# Patient Record
Sex: FEMALE | Race: WHITE | NOT HISPANIC OR LATINO | URBAN - METROPOLITAN AREA
[De-identification: names, ages, dates, MRNs, and addresses within clinical notes are randomized per-mention and may not be internally consistent; named-entity substitution may affect disease eponyms.]

---

## 2023-09-19 ENCOUNTER — HOSPITAL ENCOUNTER (OUTPATIENT)
Dept: DATA CONVERSION | Facility: HOSPITAL | Age: 23
Discharge: HOME | End: 2023-09-19

## 2023-09-19 DIAGNOSIS — Z11.3 ENCOUNTER FOR SCREENING FOR INFECTIONS WITH A PREDOMINANTLY SEXUAL MODE OF TRANSMISSION: ICD-10-CM

## 2023-09-19 DIAGNOSIS — Z12.4 ENCOUNTER FOR SCREENING FOR MALIGNANT NEOPLASM OF CERVIX: ICD-10-CM

## 2023-09-19 DIAGNOSIS — Z01.419 ENCOUNTER FOR GYNECOLOGICAL EXAMINATION (GENERAL) (ROUTINE) WITHOUT ABNORMAL FINDINGS: ICD-10-CM

## 2023-09-19 LAB
HBV SURFACE AG SER QL: NEGATIVE
HCV AB SER QL: NORMAL
HIV 1+2 AB+HIV1 P24 AG SERPL QL IA: NORMAL

## 2023-09-20 LAB
C TRACH RRNA SPEC QL NAA+PROBE: NORMAL
DRUG SCREEN COMMENT UR-IMP: NORMAL
N GONORRHOEA RRNA SPEC QL NAA+PROBE: NORMAL
SPECIMEN SOURCE: NORMAL

## 2023-09-22 LAB — RPR SER QL: NONREACTIVE

## 2023-11-11 ENCOUNTER — LAB REQUISITION (OUTPATIENT)
Dept: LAB | Facility: HOSPITAL | Age: 23
End: 2023-11-11

## 2023-11-11 LAB — SARS-COV-2 RNA RESP QL NAA+PROBE: DETECTED

## 2023-11-11 PROCEDURE — 87635 SARS-COV-2 COVID-19 AMP PRB: CPT

## 2024-12-04 ENCOUNTER — APPOINTMENT (OUTPATIENT)
Dept: PRIMARY CARE | Facility: CLINIC | Age: 24
End: 2024-12-04
Payer: COMMERCIAL

## 2024-12-13 ENCOUNTER — APPOINTMENT (OUTPATIENT)
Dept: PRIMARY CARE | Facility: CLINIC | Age: 24
End: 2024-12-13
Payer: COMMERCIAL

## 2024-12-13 VITALS
SYSTOLIC BLOOD PRESSURE: 112 MMHG | OXYGEN SATURATION: 97 % | WEIGHT: 190 LBS | HEART RATE: 89 BPM | BODY MASS INDEX: 29.76 KG/M2 | DIASTOLIC BLOOD PRESSURE: 79 MMHG

## 2024-12-13 DIAGNOSIS — Z13.21 ENCOUNTER FOR VITAMIN DEFICIENCY SCREENING: ICD-10-CM

## 2024-12-13 DIAGNOSIS — Z00.00 WELLNESS EXAMINATION: Primary | ICD-10-CM

## 2024-12-13 DIAGNOSIS — R63.5 WEIGHT GAIN: ICD-10-CM

## 2024-12-13 DIAGNOSIS — Z13.220 SCREENING FOR HYPERLIPIDEMIA: ICD-10-CM

## 2024-12-13 LAB
25(OH)D3 SERPL-MCNC: 24 NG/ML (ref 30–100)
ALBUMIN SERPL BCP-MCNC: 4.5 G/DL (ref 3.4–5)
ALP SERPL-CCNC: 100 U/L (ref 33–110)
ALT SERPL W P-5'-P-CCNC: 13 U/L (ref 7–45)
ANION GAP SERPL CALC-SCNC: 9 MMOL/L (ref 10–20)
AST SERPL W P-5'-P-CCNC: 15 U/L (ref 9–39)
BILIRUB SERPL-MCNC: 0.6 MG/DL (ref 0–1.2)
BUN SERPL-MCNC: 14 MG/DL (ref 6–23)
CALCIUM SERPL-MCNC: 9.4 MG/DL (ref 8.6–10.3)
CHLORIDE SERPL-SCNC: 106 MMOL/L (ref 98–107)
CHOLEST SERPL-MCNC: 159 MG/DL (ref 0–199)
CHOLESTEROL/HDL RATIO: 4.6
CO2 SERPL-SCNC: 25 MMOL/L (ref 21–32)
CREAT SERPL-MCNC: 0.75 MG/DL (ref 0.5–1.05)
EGFRCR SERPLBLD CKD-EPI 2021: >90 ML/MIN/1.73M*2
GLUCOSE SERPL-MCNC: 83 MG/DL (ref 74–99)
HDLC SERPL-MCNC: 34.6 MG/DL
LDLC SERPL CALC-MCNC: 102 MG/DL
NON HDL CHOLESTEROL: 124 MG/DL (ref 0–149)
POTASSIUM SERPL-SCNC: 4.1 MMOL/L (ref 3.5–5.3)
PROT SERPL-MCNC: 7.5 G/DL (ref 6.4–8.2)
SODIUM SERPL-SCNC: 136 MMOL/L (ref 136–145)
T4 FREE SERPL-MCNC: 0.88 NG/DL (ref 0.61–1.12)
TRIGL SERPL-MCNC: 114 MG/DL (ref 0–149)
TSH SERPL-ACNC: 4.51 MIU/L (ref 0.44–3.98)
VLDL: 23 MG/DL (ref 0–40)

## 2024-12-13 PROCEDURE — 84443 ASSAY THYROID STIM HORMONE: CPT

## 2024-12-13 PROCEDURE — 99385 PREV VISIT NEW AGE 18-39: CPT

## 2024-12-13 PROCEDURE — 82306 VITAMIN D 25 HYDROXY: CPT

## 2024-12-13 PROCEDURE — 84439 ASSAY OF FREE THYROXINE: CPT

## 2024-12-13 PROCEDURE — 80061 LIPID PANEL: CPT

## 2024-12-13 PROCEDURE — 80053 COMPREHEN METABOLIC PANEL: CPT

## 2024-12-13 PROCEDURE — 1036F TOBACCO NON-USER: CPT

## 2024-12-13 ASSESSMENT — PATIENT HEALTH QUESTIONNAIRE - PHQ9
SUM OF ALL RESPONSES TO PHQ9 QUESTIONS 1 AND 2: 0
1. LITTLE INTEREST OR PLEASURE IN DOING THINGS: NOT AT ALL
2. FEELING DOWN, DEPRESSED OR HOPELESS: NOT AT ALL

## 2024-12-13 NOTE — PROGRESS NOTES
Subjective   Patient ID: Jacqueline Jennings is a 24 y.o. female who presents for Establish Care (Pt states she is here to establish care. Pt states she sees an OBGYN. No other concerns at the moment.).    Patient presents to establish care. She states that she has not had a physical in a few years but did have to stay up on immunizations for nursing school. She currently works as an ICU nurse for .   She does endorse some weight gain, concerned she may have PCOS but does have an upcoming appointment with Gynecology in January.     She states that she does have a strong family history of breast cancer. She had two paternal aunts that were diagnosed, one passed away in her early 40's and the other was diagnosed in her 50's but in remission. She does do self breast exams, but not consistently.     Past Medical, Surgical, Social and Family Hx reviewed-Yes      Last pap: 2021 - has upcoming appointment   Last Mammogram: N/A  Colon CA screening Hx: N/A  Labs: TSH, Lipid, Vit D, CMP  Up to date on immunizations: Up to date   Dentist in the past year: Yes     Menstruation: Has IUD- irregular periods  Sexual Activity: No concerns         PE:  /79   Pulse 89   Wt 86.2 kg (190 lb)   LMP 12/02/2024   SpO2 97%   BMI 29.76 kg/m²   Alert adult woman, NAD  HEENT clear  Neck supple, No LAD  Heart RRR no murmur  Lungs CTA bilat  Abdomen benign, normal BS, soft NT/ND  Skin warm, dry, clear  Neuro grossly normal, gait WNL  Affect pleasant and appropriate, memory and judgement WNL    Discussed with patient that we will complete the lab work to determine if her thyroid is a cause of her weight gain, and obtain other baseline lab work. Follow with Gynecology as scheduled and follow up with me in one year.     Assessment/Plan   Problem List Items Addressed This Visit             ICD-10-CM    Encounter for vitamin deficiency screening - Primary Z13.21    Relevant Orders    Vitamin D 25-Hydroxy,Total (for eval of Vitamin D levels)     Weight gain R63.5    Relevant Orders    TSH with reflex to Free T4 if abnormal    Comprehensive Metabolic Panel

## 2024-12-16 ENCOUNTER — PATIENT MESSAGE (OUTPATIENT)
Dept: PRIMARY CARE | Facility: CLINIC | Age: 24
End: 2024-12-16
Payer: COMMERCIAL

## 2024-12-16 ENCOUNTER — PHARMACY VISIT (OUTPATIENT)
Dept: PHARMACY | Facility: CLINIC | Age: 24
End: 2024-12-16
Payer: COMMERCIAL

## 2024-12-16 DIAGNOSIS — E03.9 HYPOTHYROIDISM, UNSPECIFIED TYPE: Primary | ICD-10-CM

## 2024-12-16 PROCEDURE — RXMED WILLOW AMBULATORY MEDICATION CHARGE

## 2024-12-16 RX ORDER — LEVOTHYROXINE SODIUM 88 UG/1
88 TABLET ORAL DAILY
Qty: 30 TABLET | Refills: 1 | Status: SHIPPED | OUTPATIENT
Start: 2025-01-14 | End: 2025-03-15

## 2024-12-16 RX ORDER — LEVOTHYROXINE SODIUM 88 UG/1
88 TABLET ORAL DAILY
Qty: 30 TABLET | Refills: 1 | Status: SHIPPED | OUTPATIENT
Start: 2024-12-16 | End: 2024-12-16

## 2024-12-16 NOTE — PROGRESS NOTES
Called and discussed labs with patient. Will start on Synthroid, and have labs repeated in 6-8 weeks to determine her therapeutic dose.

## 2025-01-06 PROCEDURE — 88175 CYTOPATH C/V AUTO FLUID REDO: CPT

## 2025-01-08 ENCOUNTER — LAB REQUISITION (OUTPATIENT)
Dept: LAB | Facility: HOSPITAL | Age: 25
End: 2025-01-08
Payer: COMMERCIAL

## 2025-01-08 DIAGNOSIS — Z11.51 ENCOUNTER FOR SCREENING FOR HUMAN PAPILLOMAVIRUS (HPV): ICD-10-CM

## 2025-01-08 DIAGNOSIS — Z01.419 ENCOUNTER FOR GYNECOLOGICAL EXAMINATION (GENERAL) (ROUTINE) WITHOUT ABNORMAL FINDINGS: ICD-10-CM

## 2025-01-14 ENCOUNTER — PHARMACY VISIT (OUTPATIENT)
Dept: PHARMACY | Facility: CLINIC | Age: 25
End: 2025-01-14
Payer: COMMERCIAL

## 2025-01-14 LAB
CYTOLOGY CMNT CVX/VAG CYTO-IMP: NORMAL
LAB AP HPV GENOTYPE QUESTION: YES
LAB AP HPV HR: NORMAL
LABORATORY COMMENT REPORT: NORMAL
LMP START DATE: NORMAL
PATH REPORT.TOTAL CANCER: NORMAL

## 2025-01-14 PROCEDURE — RXMED WILLOW AMBULATORY MEDICATION CHARGE

## 2025-02-06 ENCOUNTER — LAB (OUTPATIENT)
Dept: LAB | Facility: HOSPITAL | Age: 25
End: 2025-02-06
Payer: COMMERCIAL

## 2025-02-06 DIAGNOSIS — Z20.828 EXPOSURE TO INFLUENZA: ICD-10-CM

## 2025-02-06 DIAGNOSIS — Z20.828 EXPOSURE TO INFLUENZA: Primary | ICD-10-CM

## 2025-02-06 LAB
FLUAV RNA RESP QL NAA+PROBE: NOT DETECTED
FLUBV RNA RESP QL NAA+PROBE: NOT DETECTED
SARS-COV-2 RNA RESP QL NAA+PROBE: NOT DETECTED

## 2025-02-06 PROCEDURE — 87636 SARSCOV2 & INF A&B AMP PRB: CPT

## 2025-02-18 LAB — TSH SERPL-ACNC: 0.67 MIU/L

## 2025-02-19 ENCOUNTER — PHARMACY VISIT (OUTPATIENT)
Dept: PHARMACY | Facility: CLINIC | Age: 25
End: 2025-02-19
Payer: COMMERCIAL

## 2025-02-19 PROCEDURE — RXOTC WILLOW AMBULATORY OTC CHARGE

## 2025-02-19 PROCEDURE — RXMED WILLOW AMBULATORY MEDICATION CHARGE

## 2025-03-14 DIAGNOSIS — E03.9 HYPOTHYROIDISM, UNSPECIFIED TYPE: ICD-10-CM

## 2025-03-14 PROCEDURE — RXMED WILLOW AMBULATORY MEDICATION CHARGE

## 2025-03-14 RX ORDER — LEVOTHYROXINE SODIUM 88 UG/1
88 TABLET ORAL DAILY
Qty: 90 TABLET | Refills: 0 | Status: SHIPPED | OUTPATIENT
Start: 2025-03-14 | End: 2025-06-12

## 2025-03-21 ENCOUNTER — PHARMACY VISIT (OUTPATIENT)
Dept: PHARMACY | Facility: CLINIC | Age: 25
End: 2025-03-21
Payer: COMMERCIAL

## 2025-07-02 ENCOUNTER — PHARMACY VISIT (OUTPATIENT)
Dept: PHARMACY | Facility: CLINIC | Age: 25
End: 2025-07-02
Payer: COMMERCIAL

## 2025-07-02 DIAGNOSIS — E03.9 HYPOTHYROIDISM, UNSPECIFIED TYPE: ICD-10-CM

## 2025-07-02 PROCEDURE — RXMED WILLOW AMBULATORY MEDICATION CHARGE

## 2025-07-02 RX ORDER — LEVOTHYROXINE SODIUM 88 UG/1
88 TABLET ORAL DAILY
Qty: 90 TABLET | Refills: 0 | Status: CANCELLED | OUTPATIENT
Start: 2025-07-02 | End: 2025-09-30

## 2025-07-02 RX ORDER — LEVOTHYROXINE SODIUM 88 UG/1
88 TABLET ORAL DAILY
Qty: 90 TABLET | Refills: 0 | Status: SHIPPED | OUTPATIENT
Start: 2025-07-02 | End: 2025-09-30

## 2025-09-05 ENCOUNTER — LAB (OUTPATIENT)
Dept: LAB | Facility: HOSPITAL | Age: 25
End: 2025-09-05
Payer: COMMERCIAL

## 2025-09-06 LAB — COTININE UR QL SCN: NEGATIVE

## 2025-12-15 ENCOUNTER — APPOINTMENT (OUTPATIENT)
Dept: PRIMARY CARE | Facility: CLINIC | Age: 25
End: 2025-12-15
Payer: COMMERCIAL